# Patient Record
Sex: FEMALE | Race: WHITE | NOT HISPANIC OR LATINO | URBAN - NONMETROPOLITAN AREA
[De-identification: names, ages, dates, MRNs, and addresses within clinical notes are randomized per-mention and may not be internally consistent; named-entity substitution may affect disease eponyms.]

---

## 2024-04-01 ENCOUNTER — OFFICE (OUTPATIENT)
Dept: URBAN - NONMETROPOLITAN AREA CLINIC 1 | Facility: CLINIC | Age: 44
End: 2024-04-01
Payer: COMMERCIAL

## 2024-04-01 VITALS
DIASTOLIC BLOOD PRESSURE: 88 MMHG | WEIGHT: 194 LBS | HEIGHT: 64 IN | HEART RATE: 87 BPM | SYSTOLIC BLOOD PRESSURE: 123 MMHG

## 2024-04-01 DIAGNOSIS — R10.13 EPIGASTRIC PAIN: ICD-10-CM

## 2024-04-01 DIAGNOSIS — R93.3 ABNORMAL FINDINGS ON DIAGNOSTIC IMAGING OF OTHER PARTS OF DI: ICD-10-CM

## 2024-04-01 DIAGNOSIS — D64.9 ANEMIA, UNSPECIFIED: ICD-10-CM

## 2024-04-01 DIAGNOSIS — R74.8 ABNORMAL LEVELS OF OTHER SERUM ENZYMES: ICD-10-CM

## 2024-04-01 PROCEDURE — 99205 OFFICE O/P NEW HI 60 MIN: CPT | Performed by: NURSE PRACTITIONER

## 2024-04-01 PROCEDURE — 36415 COLL VENOUS BLD VENIPUNCTURE: CPT | Performed by: NURSE PRACTITIONER

## 2024-04-01 NOTE — SERVICEHPINOTES
Patient with a history of GERD, cholecystectomy, and tobacco use presents to the clinic for epigastric pain.  She reports epigastric pain, LUQ pain regardless of meal intake in which lead her to the ER at [facility] [place] on 3/28/24.  Labs revealed elevated alk phos 141, ast 102, alt 52 with anemia hgb 8.6. normal tbili and albumin.  CT AP w/ contrast obtained revealed no evidence of biliary obstruction, unremarkable liver, spleen, pancrea, adrenals, and left kidney, but did reveal minimal colonic diverticulosis and mild diffuse pancolitis. small bowel normal. She was prescribed Bentyl, Zofran, Cipro, oxycodone in which she states the xycodone is the only thing helping her abdominal pain.  She tells me she has a history of cholecystitis s/p cholecystectomy and choledocholithiasis s/p ERCP many years ago in [place], [place].  She states the epigastric/LUQ abdominal pain she is experiencing now, is the same pain she had years ago in which she had ERCP and thinks she is experiencing same s/s.  She reports nausea intermittently as well.  Denies vomiting, melena, hematochezia, unintentional weight loss, diarrhea, or fever.  She has chronic GERD managed well with Prevacid 30mg po qd, denies flare up.  She smoke cigarettes 1ppd and has for years.  She denies a history of known liver disease or hepatitis.  Has never had colonoscopy.  Family history of crc in brother age 21.  Mother passed from pancreatic cancer.  Has a 1-2 BM daily, and has a good appetite other than when she is nauseated.  Denies cardiac stents, anticoagulation therapy, dialysis, or supplemental oxygenation use. br br Cholecystectomy 2004 and history of stones in bile duct-hx of ERCP in [place] where she is originally from. [place], [place].br 
br 
ER labs 3/28/24-alk-phos 141,albumin 4.2,  ALT 52, , T bili 1.0, lipase 106, WBC 5.5, hemoglobin 8.6, hematocrit 31.3, platelet 253, prescribed Bentyl, Zofran, Cipro, oxybr
br  CT AP w/ contrast 3/28/2024brGallbladder is surgically absent. No evidence of biliary brobstruction. The liver, spleen, pancreas, adrenals, and left kidney brare unremarkable. Small right renal cysts are seen. No significant brvascular abnormality.brUrinary bladder has normal contour. Uterus and adnexal regions are brunremarkable. Bowel has normal caliber. Minimal colonic brdiverticulosis is seen. The colon is relatively decompressed, but brappears to have mild diffuse circumferential wall thickening, bralthough this could be artifactual. Small bowel appears normal. The brappendix is normal. No ascites, lymphadenopathy, or pneumoperitoneum bris seen. brIMPRESSION:brFindings suggestive of mild pancolitis. Correlate clinically.

## 2024-04-01 NOTE — SERVICENOTES
Risks of procedure explained to patient she wishes to proceed 
Bowel prep prescribed instructions given to patient 
EGD for anemia, epigastric pain, tobacco use to rule out GI bleed, H pylori, PUD
We will obtain labs today to trend liver enzymes and alk-phos pending results consider MRCP versus ERCP to rule out choledocholithiasis 
Anemia noted we will obtain CBC and trend hemoglobin 
Colonoscopy in the setting of abnormal CT scan and anemia to rule out GI bleed, IBD
Will request records from previous EGD/ERCP and review as well
Advised pt if s/s worsen go to ER.
Smoking cessation discussed in office. 
Avoid NSAIDs.
ER f/u-reviewed records, labs, and imaging from ER noted above.

## 2024-06-12 ENCOUNTER — ON CAMPUS - OUTPATIENT (OUTPATIENT)
Dept: URBAN - NONMETROPOLITAN AREA HOSPITAL 34 | Facility: HOSPITAL | Age: 44
End: 2024-06-12

## 2024-06-12 DIAGNOSIS — K57.30 DIVERTICULOSIS OF LARGE INTESTINE WITHOUT PERFORATION OR ABS: ICD-10-CM

## 2024-06-12 DIAGNOSIS — D64.9 ANEMIA, UNSPECIFIED: ICD-10-CM

## 2024-06-12 DIAGNOSIS — Z80.0 FAMILY HISTORY OF MALIGNANT NEOPLASM OF DIGESTIVE ORGANS: ICD-10-CM

## 2024-06-12 PROCEDURE — 45378 DIAGNOSTIC COLONOSCOPY: CPT | Performed by: INTERNAL MEDICINE

## 2024-06-12 PROCEDURE — 43239 EGD BIOPSY SINGLE/MULTIPLE: CPT | Mod: 51 | Performed by: INTERNAL MEDICINE
